# Patient Record
Sex: MALE | Race: WHITE | Employment: STUDENT | URBAN - METROPOLITAN AREA
[De-identification: names, ages, dates, MRNs, and addresses within clinical notes are randomized per-mention and may not be internally consistent; named-entity substitution may affect disease eponyms.]

---

## 2024-10-18 ENCOUNTER — OFFICE VISIT (OUTPATIENT)
Age: 19
End: 2024-10-18
Payer: COMMERCIAL

## 2024-10-18 VITALS
HEIGHT: 66 IN | SYSTOLIC BLOOD PRESSURE: 135 MMHG | WEIGHT: 207 LBS | DIASTOLIC BLOOD PRESSURE: 75 MMHG | HEART RATE: 58 BPM | BODY MASS INDEX: 33.27 KG/M2

## 2024-10-18 DIAGNOSIS — R94.6 BORDERLINE ABNORMAL THYROID FUNCTION TEST: ICD-10-CM

## 2024-10-18 DIAGNOSIS — R94.6 BORDERLINE ABNORMAL THYROID FUNCTION TEST: Primary | ICD-10-CM

## 2024-10-18 LAB
T4 FREE SERPL-MCNC: 0.9 NG/DL (ref 0.8–1.5)
TSH SERPL DL<=0.05 MIU/L-ACNC: 4.17 UIU/ML (ref 0.36–3.74)

## 2024-10-18 PROCEDURE — 99204 OFFICE O/P NEW MOD 45 MIN: CPT | Performed by: INTERNAL MEDICINE

## 2024-10-18 RX ORDER — CETIRIZINE HYDROCHLORIDE 10 MG/1
10 TABLET ORAL DAILY
COMMUNITY

## 2024-10-18 RX ORDER — AZELASTINE 1 MG/ML
1 SPRAY, METERED NASAL 2 TIMES DAILY
COMMUNITY
Start: 2024-10-17 | End: 2025-10-17

## 2024-10-18 NOTE — PROGRESS NOTES
it's okay to take 2 pills the next day to catch up on your dose.  The goal is always to get 7 pills per week and even if you have to double up several days in a row to make up for missed doses, this is better than letting your weekly level fall.    2) Sign up for [employer] using the text link I sent to you and download the [employer] rohit from the rohit store (Kiwupphone) or Google play store (android) (make sure you allow locations and choose the [employer] portal and choose to receive notifications) so you can communicate with me through the patient portal.  I will send you a message with your lab results.    3) If we decide to start treatment, then we will do so for 6-8 weeks and then can repeat labs and see the effect of the dose.            Return in about 9 months (around 7/18/2025).     Copy sent to:  Rio Mead MD

## 2024-10-18 NOTE — PATIENT INSTRUCTIONS
1) TSH is a thyroid test.  Your level was 4.7 in 4/24 which is high and above goal of 0.5-2.0 but was back down to 2.6 in 7/24.  This test goes opposite of your thyroid function and suggests you may have mild hypothyroidism (slow metabolism) and therefore would benefit from starting a low dose of levothyroxine if your repeat TSH is over 2.5 and/or you have elevated thyroid antibodies which would fit with Hashimoto's thyroiditis which is the most common cause of hypothyroidism in the world.  If we decide to treat, I will pick a dose and [facility] to [facility] and you will take this as follows: Take Levothyroxine either in the morning with just water, at least 30 minutes before breakfast and coffee and all other pills, or take it at bedtime on any empty stomach 2-3 hours after dinner.  This must be  from vitamins, calcium, or iron by at least 4 hours.  If you ever do miss a dose of levothyroxine, it's okay to take 2 pills the next day to catch up on your dose.  The goal is always to get 7 pills per week and even if you have to double up several days in a row to make up for missed doses, this is better than letting your weekly level fall.    2) Sign up for [employer] using the text link I sent to you and download the [employer] rohit from the rohit store ([employer]) or Google play store (android) (make sure you allow locations and choose the [employer] portal and choose to receive notifications) so you can communicate with me through the patient portal.  I will send you a message with your lab results.    3) If we decide to start treatment, then we will do so for 6-8 weeks and then can repeat labs and see the effect of the dose.

## 2024-10-21 LAB
THYROGLOB AB SERPL-ACNC: 1270.4 IU/ML (ref 0–0.9)
THYROPEROXIDASE AB SERPL-ACNC: 246 IU/ML (ref 0–34)

## 2024-10-29 ENCOUNTER — TELEPHONE (OUTPATIENT)
Age: 19
End: 2024-10-29

## 2024-10-29 NOTE — TELEPHONE ENCOUNTER
Please call pt to let him know he has an unread message in [employer]:    TSH is a thyroid test.  Your level is 4.17 which is high and above goal of 0.45-2.0.  The TSH goes opposite of your thyroid function and confirms you do have hypothyroidism that would benefit from treatment.  -------------------------------------------------------------------------------------------------------------------  Your thyroid peroxidase (TPO) antibody and thyroglobulin antibody levels were quite elevated which indicates that you do have an autoimmune thyroid disease called Hashimoto's disease as the cause of your hypothyroidism and is another reason you would benefit from treatment.  We will not follow these levels over time but will simply adjust your dose of medication based on your symptoms and TSH and free T4 levels.  -------------------------------------------------------------------------------------------------------------------  I sent levothyroxine 100 mcg daily to [facility].  This will be ready for  at the pharmacy today.  Take this as listed in the after summary I gave you at your visit.  -------------------------------------------------------------------------------------------------------------------  I put an order directly into the [employer] system to repeat your labs in 6-8 weeks and in the 1-2 weeks prior to your next visit so just ask for the order under my name and make a note on your calendar to have these done at these times. These orders were also put directly into the [employer] portal.  Go under menu and my record and scroll down to upcoming tests and procedures and you are welcome to print these off or bring a copy of the orders using the [employer] rohit on your phone to the lab. Thanks!

## 2024-10-29 NOTE — TELEPHONE ENCOUNTER
LVM informing pt Dr. Giles has sent you a [employer] message that you have not yet read. Please read this as soon as possible and if you are unable to get into [employer] to read the message then please call me back and I'm happy to read the message to you.

## 2024-12-05 DIAGNOSIS — R94.6 BORDERLINE ABNORMAL THYROID FUNCTION TEST: ICD-10-CM

## 2025-07-04 DIAGNOSIS — R94.6 BORDERLINE ABNORMAL THYROID FUNCTION TEST: ICD-10-CM

## 2025-07-18 ENCOUNTER — OFFICE VISIT (OUTPATIENT)
Age: 20
End: 2025-07-18
Payer: COMMERCIAL

## 2025-07-18 VITALS
HEIGHT: 66 IN | DIASTOLIC BLOOD PRESSURE: 72 MMHG | BODY MASS INDEX: 30.53 KG/M2 | HEART RATE: 65 BPM | WEIGHT: 190 LBS | SYSTOLIC BLOOD PRESSURE: 122 MMHG

## 2025-07-18 DIAGNOSIS — E06.3 HYPOTHYROIDISM DUE TO HASHIMOTO'S THYROIDITIS: ICD-10-CM

## 2025-07-18 DIAGNOSIS — E06.3 HYPOTHYROIDISM DUE TO HASHIMOTO'S THYROIDITIS: Primary | ICD-10-CM

## 2025-07-18 PROCEDURE — 99213 OFFICE O/P EST LOW 20 MIN: CPT | Performed by: INTERNAL MEDICINE

## 2025-07-18 NOTE — PROGRESS NOTES
Chief Complaint   Patient presents with    Thyroid Problem    Follow-up    Other     Patient consents to [facility]  PCP and Pharmacy verified        History of Present Illness: Jaquan Gardner is a 20 y.o. male here for follow up of thyroid.  Weight down 17 lbs since last visit in 10/24.      History of Present Illness  The patient is a 20-year-old male here for a follow-up of thyroid.    He has been on a daily regimen of levothyroxine 100 mcg since his last visit in 10/2024. He typically takes the medication upon waking, before breakfast, and attempts to wait 30 minutes before consuming food or coffee, although not always successfully. The medication is taken with water, and he occasionally consumes coffee within 30 minutes of taking the medication. He also takes multivitamins at night, but not on a daily basis.    He reports no significant changes in his condition since starting the medication. Symptoms of low sex drive and erectile function have improved over the past 8 to 9 months, but he is uncertain if this is related to his thyroid condition. He continues to experience fatigue, but it is less severe than before. He reports no issues with hair loss, dry skin, or brittle nails. Bowel movements are regular, although he has noticed an increase in gas production over the past few years. He believes that his bloating and constipation have improved with the thyroid medication. Despite dietary changes, he still experiences gas. He reports no chest pain, heart palpitations, or tremors since starting the medication. He feels comfortable in terms of temperature regulation. He reports no neck pain or ankle swelling.    Will [place] [employer] [facility] [place]25.  He went to [facility] for a study abroad program for a month in June.      Current Outpatient Medications   Medication Sig    levothyroxine (SYNTHROID) 100 MCG tablet Take 1 tablet by mouth daily    Multiple Vitamin (MULTIVITAMIN ADULT PO) Take 1 tablet by mouth daily     No

## 2025-07-18 NOTE — PATIENT INSTRUCTIONS
1) Keep taking levothyroxine 100 mcg daily until you hear back from me the week of 8/4/25.    2) Do your best to wait a full 30 min before eating and coffee when taking the levothyroxine to ensure proper absorption or 2 hours after a meal.  If you ever do miss a dose of levothyroxine, it's okay to take 2 pills the next day to catch up on your dose.  The goal is always to get 7 pills per week and even if you have to double up several days in a row to make up for missed doses, this is better than letting your weekly level fall.

## 2025-07-19 LAB
T4 FREE SERPL-MCNC: 1.1 NG/DL (ref 0.8–1.5)
TSH SERPL DL<=0.05 MIU/L-ACNC: 3.4 UIU/ML (ref 0.36–3.74)

## 2025-08-08 RX ORDER — LEVOTHYROXINE SODIUM 100 UG/1
100 TABLET ORAL DAILY
Qty: 90 TABLET | Refills: 3 | Status: SHIPPED
Start: 2025-08-08 | End: 2025-08-09 | Stop reason: DRUGHIGH

## 2025-08-14 ENCOUNTER — TELEPHONE (OUTPATIENT)
Age: 20
End: 2025-08-14